# Patient Record
Sex: FEMALE | Race: WHITE | Employment: UNEMPLOYED | URBAN - METROPOLITAN AREA
[De-identification: names, ages, dates, MRNs, and addresses within clinical notes are randomized per-mention and may not be internally consistent; named-entity substitution may affect disease eponyms.]

---

## 2020-09-25 ENCOUNTER — VIRTUAL VISIT (OUTPATIENT)
Dept: BEHAVIORAL/MENTAL HEALTH CLINIC | Age: 55
End: 2020-09-25
Payer: COMMERCIAL

## 2020-09-25 DIAGNOSIS — F41.1 GENERALIZED ANXIETY DISORDER: Primary | ICD-10-CM

## 2020-09-25 DIAGNOSIS — F41.0 PANIC ATTACKS: ICD-10-CM

## 2020-09-25 PROCEDURE — 90792 PSYCH DIAG EVAL W/MED SRVCS: CPT | Performed by: NURSE PRACTITIONER

## 2020-09-25 RX ORDER — ALPRAZOLAM 0.5 MG/1
0.5 TABLET ORAL
Qty: 30 TAB | Refills: 0 | Status: SHIPPED | OUTPATIENT
Start: 2020-09-25 | End: 2020-10-25

## 2020-09-25 RX ORDER — ALPRAZOLAM 1 MG/1
1 TABLET, EXTENDED RELEASE ORAL
Qty: 60 TAB | Refills: 1 | Status: SHIPPED | OUTPATIENT
Start: 2020-09-25 | End: 2020-10-25
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
## 2020-11-30 ENCOUNTER — VIRTUAL VISIT (OUTPATIENT)
Dept: BEHAVIORAL/MENTAL HEALTH CLINIC | Age: 55
End: 2020-11-30
Payer: COMMERCIAL

## 2020-11-30 DIAGNOSIS — F41.1 GENERALIZED ANXIETY DISORDER: Primary | ICD-10-CM

## 2020-11-30 DIAGNOSIS — F41.0 PANIC DISORDER: ICD-10-CM

## 2020-11-30 PROCEDURE — 99212 OFFICE O/P EST SF 10 MIN: CPT | Performed by: NURSE PRACTITIONER

## 2020-11-30 RX ORDER — ALPRAZOLAM 0.5 MG/1
0.5 TABLET ORAL
Qty: 30 TAB | Refills: 2 | Status: SHIPPED | OUTPATIENT
Start: 2020-11-30 | End: 2021-04-22

## 2020-11-30 RX ORDER — ALPRAZOLAM 1 MG/1
1 TABLET, EXTENDED RELEASE ORAL
Qty: 60 TAB | Refills: 2 | Status: SHIPPED | OUTPATIENT
Start: 2020-11-30 | End: 2021-03-07

## 2020-11-30 NOTE — PROGRESS NOTES
Nevin Quintanilla is a 60 y.o. female who presents today for the following:  Chief Complaint   Patient presents with    Follow-up     \"I was a little bit more anxious leading up to Thanksgiving but today am okay. \"   Katty Martinez, who was evaluated through a synchronous (real-time) audio-video encounter, and/or her healthcare decision maker, is aware that it is a billable service, with coverage as determined by her insurance carrier. She provided verbal consent to proceed: YES-Consent obtained within past 12 months:Yes, and patient identification was verified. It was conducted pursuant to the emergency declaration under the [facility], [address] [place] [address] authority and the [employer] and [employer] Act. A caregiver was present when appropriate. Ability to conduct physical exam was limited. I was at home. The patient was at home. Allergies   Allergen Reactions    Green Pepper Nausea Only       Current Outpatient Medications   Medication Sig    ALPRAZolam (Xanax XR) 1 mg XR tablet Take 1 Tab by mouth two (2) times daily as needed for Anxiety for up to 30 days. Max Daily Amount: 2 mg.  ALPRAZolam (XANAX) 0.5 mg tablet Take 1 Tab by mouth daily as needed for Anxiety (panic attacks) for up to 30 days.  ONETOUCH ULTRA TEST strip USE TO TEST BLOOD SUGAR EVERY DAY    VICTOZA 3-TERRY 0.6 mg/0.1 mL (18 mg/3 mL) sub-q pen INJECT 1.8 MG UNDER THE SKIN IN THE MORNING    INVOKANA 300 mg tablet TAKE 1 TABLET DAILY    metFORMIN ER (GLUCOPHAGE XR) 500 mg tablet TAKE 2 TABLETS TWICE A DAY WITH MEALS    Insulin Needles, Disposable, (ANDREA PEN NEEDLE) 32 x 5/32 \" ndle Use to inject Victoza daily    FEXOFENADINE HCL (ALLEGRA PO) Take  by mouth.  ASPIRIN PO Take  by mouth.  pravastatin (PRAVACHOL) 20 mg tablet Take 1 Tab by mouth nightly.  albuterol (VENTOLIN HFA) 90 mcg/actuation inhaler Take  by inhalation.     omeprazole (PRILOSEC) 40 mg capsule Take 1 Cap by mouth daily.  OTHER,NON-FORMULARY,      No current facility-administered medications for this visit. Past Medical History:   Diagnosis Date    Asthma     Diabetes ([address]     Hyperlipidemia     Psychiatric disorder     anxiety       Past Surgical History:   Procedure Laterality Date    HX ENDOSCOPY      Throat    HX ORTHOPAEDIC      wrist    HX OTHER SURGICAL  2014    frozen shoulder       Family History   Problem Relation Age of Onset    Cancer Mother         breast    Heart Attack Father     Depression Other         mother       Social History     Socioeconomic History    Marital status:      Spouse name: Not on file    Number of children: 0    Years of education: Not on file    Highest education level: Some college, no degree   Occupational History    Not on file   Social Needs    Financial resource strain: Not hard at all   [employer] insecurity     Worry: Never true     Inability: Never true   [employer] needs     Medical: No     Non-medical: No   Tobacco Use    Smoking status: Former Smoker    Smokeless tobacco: Never Used   Substance and Sexual Activity    Alcohol use:  Yes     Alcohol/week: 3.0 standard drinks     Types: 3 Cans of beer per week     Frequency: 2-4 times a month     Drinks per session: 3 or 4    Drug use: No    Sexual activity: Yes     Partners: Male     Comment: no periods since 1998   Lifestyle    Physical activity     Days per week: Not on file     Minutes per session: Not on file    Stress: Not on file   Relationships    Social connections     Talks on phone: Not on file     Gets together: Not on file     Attends Confucianism service: Not on file     Active member of club or organization: Not on file     Attends meetings of clubs or organizations: Not on file     Relationship status: Not on file    Intimate partner violence     Fear of current or ex partner: Not on file     Emotionally abused: Not on file Physically abused: Not on file     Forced sexual activity: Not on file   Other Topics Concern    Not on file   Social History Narrative    Not on file         Mrs. Liz Harper follows up in the clinic for generalized anxiety disorder and panic attacks. She is prescribed Xanax XR 1 mg tablet take 1 tablet twice daily and Xanax 0.5 mg tablet take 1 tablet daily as needed for panic attacks. Patient presents with euthymic mood. She reports having increased anxiety leading up to the holiday. Otherwise, she has been doing well. She reports spending time with her family over Thanksgiving. She mentions Kimber was the first time she has seen her grandchildren since March. She denies feeling depressed or anxious on today's visit. No suicidal or homicidal thinking noted, risk for suicide is low, protective factor-family. No psychotic symptoms observed or reported. She is requesting refills on today's visit. Patient also shares that she has a follow-up appointment Thursday for blood work with Arun Brown practitioner. Patient denies smoking and drug use. She drinks about 3 beers over the weekend. Review of Systems   All other systems reviewed and are negative. There were no vitals taken for this visit. Physical Exam  Psychiatric:         Attention and Perception: Attention and perception normal.         Mood and Affect: Mood and affect normal.         Speech: Speech normal.         Behavior: Behavior normal. Behavior is cooperative. Thought Content: Thought content normal.         Cognition and Memory: Cognition and memory normal.         Judgment: Judgment normal.        Plan:    Continue Xanax as prescribed. Follow-up with primary care provider as appropriate. Advised patient to avoid alcohol and drug use. Advised patient to call 911 or go to the emergency department for suicidal homicidal thinking. Obtain compliance drug analysis on next visit.     There are no diagnoses linked to this encounter.

## 2021-03-07 DIAGNOSIS — F41.1 GENERALIZED ANXIETY DISORDER: ICD-10-CM

## 2021-03-07 RX ORDER — ALPRAZOLAM 1 MG/1
TABLET, EXTENDED RELEASE ORAL
Qty: 60 TAB | Refills: 2 | Status: SHIPPED | OUTPATIENT
Start: 2021-03-07 | End: 2021-03-23 | Stop reason: SDUPTHER

## 2021-03-23 ENCOUNTER — VIRTUAL VISIT (OUTPATIENT)
Dept: BEHAVIORAL/MENTAL HEALTH CLINIC | Age: 56
End: 2021-03-23
Payer: COMMERCIAL

## 2021-03-23 DIAGNOSIS — F41.1 GENERALIZED ANXIETY DISORDER: Primary | ICD-10-CM

## 2021-03-23 PROCEDURE — 99213 OFFICE O/P EST LOW 20 MIN: CPT | Performed by: NURSE PRACTITIONER

## 2021-03-23 RX ORDER — ALPRAZOLAM 1 MG/1
TABLET, EXTENDED RELEASE ORAL
Qty: 60 TAB | Refills: 2 | Status: SHIPPED | OUTPATIENT
Start: 2021-03-23 | End: 2021-09-24 | Stop reason: SDUPTHER

## 2021-03-23 NOTE — PROGRESS NOTES
Maynor Hammond (: 1965) is a 60 y.o. female, established patient, here for evaluation of the following chief complaint(s): Anxiety (\"I've been doing good but I had some anxiety through the holidays and COVID. \") and Follow-up       ASSESSMENT/PLAN:  1. Generalized anxiety disorder      Return in about 3 months (around 2021), or if symptoms worsen or fail to improve. SUBJECTIVE/OBJECTIVE:  Mrs. Pita Dean follows up in the clinic for generalized anxiety disorder and panic attacks. She is prescribed Xanax XR 1 mg tablet take 1 tablet twice daily and Xanax 0.5 mg tablet take 1 tablet daily as needed for panic attacks. Patient reports that she usually takes Xanax 0.5 mg tablet 1 to 2 times a week and uses Xanax XR 1 mg tablet at least once daily. On today's visit, she is requesting refills. Patient presents with euthymic mood. She is pleasant and smiling on interaction. She reports that she had increased anxiety starting in December and January. She continues to have some mild anxiety, however Xanax is effective in controlling anxiety symptoms and panic attacks. She reports being worried about her father because she may have exposed him to Covid. She appears mildly anxious and worried when discussing possible COVID exposure. She is in quarantine at this time due to possible exposure. It is noted patient received second Covid vaccine per patient. She has stable appetite and sleep. No psychotic symptoms reported. None noted during interaction. No suicidal/homicidal thinking noted, risk for suicide is low, protective factor-family. Patient reports feeling stressed out due to work related issues. 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 She denies smoking and drug use. She drinks beer occasionally. It is noted patient is scheduled to see an endocrinologist  for diabetes management.   Patient reports her PCP recommends that she starts insulin, however she feels the need to see an endocrinologist first to discuss other treatment options. Patient reports good family and social support. Review of Systems   Psychiatric/Behavioral: The patient is nervous/anxious. All other systems reviewed and are negative. Patient-Reported Vitals 3/23/2021   Patient-Reported Weight 232 pounds       Physical Exam  Psychiatric:         Attention and Perception: Attention and perception normal.         Mood and Affect: Mood is anxious. Speech: Speech normal.         Behavior: Behavior normal. Behavior is cooperative. Thought Content: Thought content normal.         Cognition and Memory: Cognition and memory normal.         Judgment: Judgment normal.         Plan:    Continue Xanax as prescribed. Advised patient to avoid alcohol and drug use. Advised patient to call 911 or go to the emergency department for suicidal/homicidal thinking. Patient may call the clinic for any issues and follow-up with medical provider and specialist as appropriate. Christine Hansen, was evaluated through a synchronous (real-time) audio-video encounter. The patient (or guardian if applicable) is aware that this is a billable service. Verbal consent to proceed has been obtained within the past 12 months. The visit was conducted pursuant to the emergency declaration under the [facility], [address] [place] [address] authority and the [employer] and [employer] General Act. Patient identification was verified, and a caregiver was present when appropriate. The patient was located in a state where the provider was credentialed to provide care. An electronic signature was used to authenticate this note.   -- Maribel Robles NP

## 2021-04-22 DIAGNOSIS — F41.0 PANIC DISORDER: ICD-10-CM

## 2021-04-22 RX ORDER — ALPRAZOLAM 0.5 MG/1
TABLET ORAL
Qty: 30 TAB | Refills: 2 | Status: SHIPPED | OUTPATIENT
Start: 2021-04-22 | End: 2021-09-24 | Stop reason: SDUPTHER

## 2021-09-24 ENCOUNTER — OFFICE VISIT (OUTPATIENT)
Dept: BEHAVIORAL/MENTAL HEALTH CLINIC | Age: 56
End: 2021-09-24
Payer: COMMERCIAL

## 2021-09-24 VITALS — WEIGHT: 232.8 LBS | BODY MASS INDEX: 33.4 KG/M2

## 2021-09-24 DIAGNOSIS — F41.1 GENERALIZED ANXIETY DISORDER: ICD-10-CM

## 2021-09-24 DIAGNOSIS — Z51.81 MEDICATION MONITORING ENCOUNTER: Primary | ICD-10-CM

## 2021-09-24 DIAGNOSIS — F41.0 PANIC DISORDER: ICD-10-CM

## 2021-09-24 PROCEDURE — 99214 OFFICE O/P EST MOD 30 MIN: CPT | Performed by: NURSE PRACTITIONER

## 2021-09-24 RX ORDER — ALPRAZOLAM 1 MG/1
TABLET, EXTENDED RELEASE ORAL
Qty: 60 TABLET | Refills: 3 | Status: SHIPPED | OUTPATIENT
Start: 2021-09-24 | End: 2022-01-24

## 2021-09-24 RX ORDER — ALPRAZOLAM 0.5 MG/1
TABLET ORAL
Qty: 30 TABLET | Refills: 3 | Status: SHIPPED | OUTPATIENT
Start: 2021-09-24 | End: 2022-01-24

## 2021-09-24 NOTE — PROGRESS NOTES
Eusebia Starks is a 60 y.o. female who presents today for the following:  Chief Complaint   Patient presents with    Follow-up     \"My father passed away June 26, 2021. \"    Anxiety       Allergies   Allergen Reactions    Green Pepper Nausea Only       Current Outpatient Medications   Medication Sig    ALPRAZolam (XANAX XR) 1 mg XR tablet take ONE tablet twice a DAY as needed FOR anxiety    ALPRAZolam (XANAX) 0.5 mg tablet take ONE tablet daily as needed FOR anxiety    ONETOUCH ULTRA TEST strip USE TO TEST BLOOD SUGAR EVERY DAY    VICTOZA 3-TERRY 0.6 mg/0.1 mL (18 mg/3 mL) sub-q pen INJECT 1.8 MG UNDER THE SKIN IN THE MORNING    INVOKANA 300 mg tablet TAKE 1 TABLET DAILY    metFORMIN ER (GLUCOPHAGE XR) 500 mg tablet TAKE 2 TABLETS TWICE A DAY WITH MEALS    Insulin Needles, Disposable, (ANDREA PEN NEEDLE) 32 x 5/32 \" ndle Use to inject Victoza daily    FEXOFENADINE HCL (ALLEGRA PO) Take  by mouth.  ASPIRIN PO Take  by mouth.  pravastatin (PRAVACHOL) 20 mg tablet Take 1 Tab by mouth nightly.  albuterol (VENTOLIN HFA) 90 mcg/actuation inhaler Take  by inhalation.  omeprazole (PRILOSEC) 40 mg capsule Take 1 Cap by mouth daily.  OTHER,NON-FORMULARY,      No current facility-administered medications for this visit.        Past Medical History:   Diagnosis Date    Asthma     Diabetes ([address]     Hyperlipidemia     Psychiatric disorder     anxiety       Past Surgical History:   Procedure Laterality Date    HX ENDOSCOPY      Throat    HX ORTHOPAEDIC      wrist    HX OTHER SURGICAL  2014    frozen shoulder       Family History   Problem Relation Age of Onset    Cancer Mother         breast    Heart Attack Father     Depression Other         mother       Social History     Socioeconomic History    Marital status:      Spouse name: Not on file    Number of children: 0    Years of education: Not on file    Highest education level: Some college, no degree   Occupational History    Not on file   Tobacco Use    Smoking status: Former Smoker    Smokeless tobacco: Never Used   Substance and Sexual Activity    Alcohol use: Yes     Alcohol/week: 3.0 standard drinks     Types: 3 Cans of beer per week     Comment: weekends    Drug use: No    Sexual activity: Yes     Partners: Male     Comment: no periods since 1998   Other Topics Concern    Not on file   Social History Narrative    Not on file     Social Determinants of Health     Financial Resource Strain: Low Risk     Difficulty of Paying Living Expenses: Not hard at all   Food Insecurity: No Food Insecurity    Worried About [address] in the Last Year: Never true    [address]  [facility] in the Last Year: Never true   Transportation Needs: No Transportation Needs    Lack of Transportation (Medical): No    Lack of Transportation (Non-Medical): No   Physical Activity:     Days of Exercise per Week:     Minutes of Exercise per Session:    Stress:     Feeling of Stress :    Social Connections:     Frequency of Communication with Friends and Family:     Frequency of Social Gatherings with Friends and Family:     Attends Gnosticist Services:     Active Member of Clubs or Organizations:     Attends Club or Organization Meetings:     Marital Status:    Intimate Partner Violence:     Fear of Current or Ex-Partner:     Emotionally Abused:     Physically Abused:     Sexually Abused:          Mrs. Brady Whaley follows up in the clinic for generalized anxiety disorder and panic attacks. She is prescribed Xanax XR 1 mg tablet take 1 tablet twice daily and Xanax 0.5 mg tablet take 1 tablet daily as needed for panic attacks. Patient last visited to clinic March 23, 2021. Patient presents to the clinic unaccompanied, clean fully alert and oriented. Gait is stable. She reports since last visit her father passed away June 26, 2021. She reports feeling sad but not depressed. Patient reports good support from her  and siblings.   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 She reports using Xanax 0.5 mg more often about 2-3 times a week and she continues Xanax extended release 1 tablet twice daily as needed for anxiety. Appetite is stable. Sleep-stable. She reports since starting Ozempic she has been having \"GERD attacks\" which \"triggers asthma\" and causes increased anxiety. Patient plans to talk with her endocrinologist Dr. Fadia Flores on scheduled appointment next month. No psychotic symptoms observed or reported. No suicidal/homicidal thinking, risk is low, protective factor-family. Patient lives in the home with her  in a stable and supportive home environment. No smoking or drug use reported. She drinks socially 3-4 times a week, patient mentions that she avoids drinking alcohol because it causes elevated blood sugar. Review of Systems   Psychiatric/Behavioral:        Patient reports feeling \"sad\" not depressed. All other systems reviewed and are negative. Visit Vitals  Wt 105.6 kg (232 lb 12.8 oz)   BMI 33.40 kg/m²     Physical Exam  Psychiatric:         Attention and Perception: Attention and perception normal.         Mood and Affect: Mood is depressed (mild ). Speech: Speech normal.         Behavior: Behavior normal. Behavior is cooperative. Thought Content: Thought content normal.         Cognition and Memory: Cognition and memory normal.         Judgment: Judgment normal.        Plan:    Continue Xanax as prescribed. Patient is requesting refills on today's visit. Compliance drug analysis requested, lab is unavailable today. Patient is advised to follow-up at a local [facility] in her area. For emergencies-call 911 or go to the emergency department. Patient is welcome to call the clinic for any issues.   Patient may return to the clinic at the next available appointment in February. Avoid alcohol and drug use.

## 2021-09-30 LAB — DRUGS UR: NORMAL

## 2022-01-22 DIAGNOSIS — F41.1 GENERALIZED ANXIETY DISORDER: ICD-10-CM

## 2022-01-22 DIAGNOSIS — F41.0 PANIC DISORDER: ICD-10-CM

## 2022-01-24 RX ORDER — ALPRAZOLAM 1 MG/1
TABLET, EXTENDED RELEASE ORAL
Qty: 60 TABLET | Refills: 3 | Status: SHIPPED | OUTPATIENT
Start: 2022-01-24 | End: 2022-02-04 | Stop reason: SDUPTHER

## 2022-01-24 RX ORDER — ALPRAZOLAM 0.5 MG/1
TABLET ORAL
Qty: 30 TABLET | Refills: 3 | Status: SHIPPED | OUTPATIENT
Start: 2022-01-24 | End: 2022-02-04 | Stop reason: SDUPTHER

## 2022-02-03 ENCOUNTER — TELEPHONE (OUTPATIENT)
Dept: BEHAVIORAL/MENTAL HEALTH CLINIC | Age: 57
End: 2022-02-03

## 2022-02-03 NOTE — TELEPHONE ENCOUNTER
Patient would like for you to give her a call. States that she has been having cold symptoms since Sunday, feels a little better, but does not feel she should come into office.   Wants to know if she can be done virtual.

## 2022-02-04 ENCOUNTER — VIRTUAL VISIT (OUTPATIENT)
Dept: BEHAVIORAL/MENTAL HEALTH CLINIC | Age: 57
End: 2022-02-04
Payer: COMMERCIAL

## 2022-02-04 DIAGNOSIS — F41.0 PANIC DISORDER: ICD-10-CM

## 2022-02-04 DIAGNOSIS — F41.1 GENERALIZED ANXIETY DISORDER: ICD-10-CM

## 2022-02-04 PROCEDURE — 99213 OFFICE O/P EST LOW 20 MIN: CPT | Performed by: NURSE PRACTITIONER

## 2022-02-04 RX ORDER — ALPRAZOLAM 1 MG/1
TABLET, EXTENDED RELEASE ORAL
Qty: 60 TABLET | Refills: 3 | Status: SHIPPED | OUTPATIENT
Start: 2022-02-04 | End: 2022-02-07

## 2022-02-04 RX ORDER — ALPRAZOLAM 0.5 MG/1
TABLET ORAL
Qty: 30 TABLET | Refills: 3 | Status: SHIPPED | OUTPATIENT
Start: 2022-02-04 | End: 2022-06-27

## 2022-02-04 NOTE — PROGRESS NOTES
Lenin Paiz (: 1965) is a 60 y.o. female, established patient, here for evaluation of the following chief complaint(s):   Follow-up (\"Feeling a little better today. \") and Anxiety       ASSESSMENT/PLAN:  Below is the assessment and plan developed based on review of pertinent history, labs, studies, and medications. 1. Generalized anxiety disorder  -     ALPRAZolam (XANAX XR) 1 mg XR tablet; take ONE tablet twice a DAY as needed FOR anxiety, Normal, Disp-60 Tablet, R-3This prescription was filled on 2021. Any refills authorized will be placed on file. 2. Panic disorder  -     ALPRAZolam (XANAX) 0.5 mg tablet; take ONE tablet daily as needed FOR anxiety, Normal, Disp-30 Tablet, R-3This prescription was filled on 2021. Any refills authorized will be placed on file. Return in about 5 months (around 2022), or if symptoms worsen or fail to improve. SUBJECTIVE/OBJECTIVE:  Mrs. Leopoldo Karvonen follows up in the clinic for generalized anxiety disorder and panic attacks. She is prescribed Xanax XR 1 mg tablet take 1 tablet twice daily and Xanax 0.5 mg tablet take 1 tablet daily as needed for panic attacks. Patient last visited the clinic 2021. Since last visit, patient reports that she had Covid in January and lost about 10 pounds. Today she reports flulike symptoms for the past week but she is feeling a little better today. Patient consents to virtual visit. Patient was unable to make in person appointment due to not feeling well. She reports having \"anxious moments\" occasionally but denies that it interferes with daily living. She is requesting to continue Xanax as prescribed because she is not \"quite ready\" for trial dose reduction. She denies feeling depressed however she reports missing her dad a lot since his passing. She has reduced appetite which she relates to being sick. Sleep is stable. No psychotic symptoms observed or reported.   No suicidal/homicidal thinking, risk for suicide is low, protective factor-family. She lives at home with her  in a stable home environment. No alcohol or drug use reported. Review of Systems   Constitutional: Positive for appetite change and fatigue. Musculoskeletal: Positive for arthralgias and myalgias. All other systems reviewed and are negative. Patient-Reported Weight: 220 pounds       Physical Exam  Psychiatric:         Attention and Perception: Attention and perception normal.         Mood and Affect: Mood and affect normal.         Speech: Speech normal.         Behavior: Behavior normal. Behavior is cooperative. Thought Content: Thought content normal.         Cognition and Memory: Cognition and memory normal.         Judgment: Judgment normal.         Plan:    Continue Xanax as prescribed. Patient is aware of risks associated with benzodiazepine use. Follow-up with medical provider as appropriate. Advised patient to avoid smoking, alcohol and drug use. Return to the clinic in 5 months or earlier if needed. Levi Mcdonald, was evaluated through doxy. me a synchronous (real-time) audio-video encounter. The patient (or guardian if applicable) is aware that this is a billable service, which includes applicable co-pays. Verbal consent to proceed has been obtained. The visit was conducted pursuant to the emergency declaration under the [facility], [address] [place] [address] [facility] authority and the [employer] Act. Patient identification was verified, and a caregiver was present when appropriate. The patient was located at home in a state where the provider was licensed to provide care. An electronic signature was used to authenticate this note.   -- Boo Pascual, GRIFFIN

## 2022-02-07 DIAGNOSIS — F41.1 GENERALIZED ANXIETY DISORDER: ICD-10-CM

## 2022-02-07 RX ORDER — ALPRAZOLAM 1 MG/1
TABLET, EXTENDED RELEASE ORAL
Qty: 60 TABLET | Refills: 3 | Status: SHIPPED | OUTPATIENT
Start: 2022-02-07 | End: 2022-06-27

## 2022-06-27 DIAGNOSIS — F41.0 PANIC DISORDER: ICD-10-CM

## 2022-06-27 DIAGNOSIS — F41.1 GENERALIZED ANXIETY DISORDER: ICD-10-CM

## 2022-06-27 RX ORDER — ALPRAZOLAM 0.5 MG/1
TABLET ORAL
Qty: 30 TABLET | Refills: 3 | Status: SHIPPED | OUTPATIENT
Start: 2022-06-27 | End: 2022-07-07 | Stop reason: SDUPTHER

## 2022-06-27 RX ORDER — ALPRAZOLAM 1 MG/1
TABLET, EXTENDED RELEASE ORAL
Qty: 60 TABLET | Refills: 3 | Status: SHIPPED | OUTPATIENT
Start: 2022-06-27 | End: 2022-07-07 | Stop reason: SDUPTHER

## 2022-07-07 ENCOUNTER — OFFICE VISIT (OUTPATIENT)
Dept: BEHAVIORAL/MENTAL HEALTH CLINIC | Age: 57
End: 2022-07-07
Payer: COMMERCIAL

## 2022-07-07 VITALS — WEIGHT: 224 LBS | BODY MASS INDEX: 32.14 KG/M2

## 2022-07-07 DIAGNOSIS — F41.0 PANIC DISORDER: ICD-10-CM

## 2022-07-07 DIAGNOSIS — F41.1 GENERALIZED ANXIETY DISORDER: ICD-10-CM

## 2022-07-07 PROCEDURE — 99213 OFFICE O/P EST LOW 20 MIN: CPT | Performed by: NURSE PRACTITIONER

## 2022-07-07 RX ORDER — ALPRAZOLAM 1 MG/1
TABLET, EXTENDED RELEASE ORAL
Qty: 60 TABLET | Refills: 5 | Status: SHIPPED | OUTPATIENT
Start: 2022-07-07

## 2022-07-07 RX ORDER — ROSUVASTATIN CALCIUM 10 MG/1
1 TABLET, COATED ORAL DAILY
COMMUNITY
Start: 2022-02-25

## 2022-07-07 RX ORDER — ALPRAZOLAM 0.5 MG/1
TABLET ORAL
Qty: 30 TABLET | Refills: 5 | Status: SHIPPED | OUTPATIENT
Start: 2022-07-07

## 2022-07-07 RX ORDER — OMEPRAZOLE 20 MG/1
2 CAPSULE, DELAYED RELEASE ORAL DAILY
COMMUNITY
Start: 2022-06-24

## 2022-07-07 RX ORDER — EMPAGLIFLOZIN 25 MG/1
25 TABLET, FILM COATED ORAL DAILY
COMMUNITY
Start: 2022-07-01

## 2022-07-07 RX ORDER — GLIPIZIDE 10 MG/1
10 TABLET ORAL
COMMUNITY
Start: 2022-05-08

## 2022-07-07 NOTE — PROGRESS NOTES
Hemalatha Alicea is a 60 y.o. female who presents today for the following:  Chief Complaint   Patient presents with    Anxiety     \"I feel like I've been doing pretty good. I still get anxious. \"    Depression    Medication Management       Allergies   Allergen Reactions    Green Pepper Nausea Only       Current Outpatient Medications   Medication Sig    semaglutide (OZEMPIC) 0.25 mg or 0.5 mg/dose (2 mg/1.5 ml) subq pen 0.75 mg by SubCUTAneous route every seven (7) days.  rosuvastatin (CRESTOR) 10 mg tablet Take 1 Tablet by mouth daily.  ALPRAZolam (XANAX XR) 1 mg XR tablet Take 1 tablet by mouth 2 times daily as needed for anxiety    ALPRAZolam (XANAX) 0.5 mg tablet take ONE tablet every DAY as needed FOR anxiety    Jardiance 25 mg tablet Take 25 mg by mouth daily.  omeprazole (PRILOSEC) 20 mg capsule Take 2 Capsules by mouth daily.  glipiZIDE (GLUCOTROL) 10 mg tablet Take 10 mg by mouth nightly.  ONETOUCH ULTRA TEST strip USE TO TEST BLOOD SUGAR EVERY DAY    pravastatin (PRAVACHOL) 20 mg tablet Take 1 Tab by mouth nightly.  albuterol (VENTOLIN HFA) 90 mcg/actuation inhaler Take  by inhalation.  omeprazole (PRILOSEC) 40 mg capsule Take 1 Cap by mouth daily.  OTHER,NON-FORMULARY,      No current facility-administered medications for this visit.        Past Medical History:   Diagnosis Date    Asthma     Diabetes ([facility] [address]     Hyperlipidemia     Psychiatric disorder     anxiety       Past Surgical History:   Procedure Laterality Date    HX ENDOSCOPY      Throat    HX ORTHOPAEDIC      wrist    HX OTHER SURGICAL  2014    frozen shoulder       Family History   Problem Relation Age of Onset    Cancer Mother         breast    Heart Attack Father     Depression Other         mother       Social History     Socioeconomic History    Marital status:      Spouse name: Not on file    Number of children: 0    Years of education: Not on file    Highest education level: Some college, no degree   Occupational History    Not on file   Tobacco Use    Smoking status: Former Smoker    Smokeless tobacco: Never Used   Substance and Sexual Activity    Alcohol use: Yes     Alcohol/week: 3.0 standard drinks     Types: 3 Cans of beer per week     Comment: weekends    Drug use: No    Sexual activity: Yes     Partners: Male     Comment: no periods since 1998   Other Topics Concern    Not on file   Social History Narrative    Not on file     Social Determinants of Health     Financial Resource Strain:     Difficulty of Paying Living Expenses: Not on file   Food Insecurity:     Worried About Running Out of Food in the Last Year: Not on file    Shu of Food in the Last Year: Not on file   Transportation Needs:     Lack of Transportation (Medical): Not on file    Lack of Transportation (Non-Medical): Not on file   Physical Activity:     Days of Exercise per Week: Not on file    Minutes of Exercise per Session: Not on file   Stress:     Feeling of Stress : Not on file   Social Connections:     Frequency of Communication with Friends and Family: Not on file    Frequency of Social Gatherings with Friends and Family: Not on file    Attends Voodoo Services: Not on file    Active Member of [address] [place] [address] [employer] or Organizations: Not on file    Attends Club or Organization Meetings: Not on file    Marital Status: Not on file   Intimate Partner Violence:     Fear of Current or Ex-Partner: Not on file    Emotionally Abused: Not on file    Physically Abused: Not on file    Sexually Abused: Not on file   Housing Stability:     Unable to Pay for Housing in the Last Year: Not on file    Number of [place] in the Last Year: Not on file    Unstable Housing in the Last Year: Not on file         Mrs. Chloe Loredo follows up in the clinic for generalized anxiety disorder and panic attacks.   She is prescribed Xanax XR 1 mg tablet take 1 tablet twice daily and Xanax 0.5 mg tablet take 1 tablet daily as needed for panic attacks. Patient last visited the clinic virtually February 4, 2022. On today's visit, she is requesting to continue medication as prescribed. Patient presents to the clinic unaccompanied, clean fully alert and oriented. Gait is stable. Mood is euthymic on today's visit. She reports having some depression after the passing of her father a year ago and anxiety is under control with Xanax. She reports that she would like to continue Xanax as prescribed however she is receptive to lowering the dose in the future. She has stable appetite and sleep. She reports blood sugars are under control and she is feeling much better. Patient shares that her son has MS and she is taking better care of herself to be an example for her son. No suicidal/homicidal thinking, risk for suicide is low, protective factor-family. No psychotic symptoms noted. No drugs or alcohol noted. Patient lives with her  in a stable home environment. 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 Review of Systems   All other systems reviewed and are negative. Visit Vitals  Wt 101.6 kg (224 lb)   BMI 32.14 kg/m²     Physical Exam  Psychiatric:         Attention and Perception: Attention and perception normal.         Mood and Affect: Mood and affect normal.         Speech: Speech normal.         Behavior: Behavior normal. Behavior is cooperative. Thought Content: Thought content normal.         Cognition and Memory: Cognition and memory normal.         Judgment: Judgment normal.        Plan:    Continue Xanax as prescribed. Patient is provided with a 6-month supply, pending follow-up with another psychiatric provider for medication management. Patient reports that she will most likely follow-up with another [address] [place] [address] psychiatric provider. Follow-up with medical providers as appropriate. For emergencies, call 911 or go to the emergency department.